# Patient Record
Sex: MALE | URBAN - METROPOLITAN AREA
[De-identification: names, ages, dates, MRNs, and addresses within clinical notes are randomized per-mention and may not be internally consistent; named-entity substitution may affect disease eponyms.]

---

## 2019-08-27 ENCOUNTER — NURSE TRIAGE (OUTPATIENT)
Dept: NURSING | Facility: CLINIC | Age: 54
End: 2019-08-27

## 2019-08-27 NOTE — TELEPHONE ENCOUNTER
Tristen is calling  and states  that  he lives in [place] and is currently in [place] and is needing a refill for his medication, Losartan, amlodipine and prevastatin.  JO phoned [facility] and was told that MD can refill his medication but if his blood pressure is out of control will have to send him to ED and JO advised Tristen of this.